# Patient Record
Sex: FEMALE | URBAN - METROPOLITAN AREA
[De-identification: names, ages, dates, MRNs, and addresses within clinical notes are randomized per-mention and may not be internally consistent; named-entity substitution may affect disease eponyms.]

---

## 2021-11-17 ENCOUNTER — OFFICE VISIT (OUTPATIENT)
Dept: URBAN - METROPOLITAN AREA CLINIC 54 | Facility: CLINIC | Age: 61
End: 2021-11-17
Payer: COMMERCIAL

## 2021-11-17 DIAGNOSIS — H25.13 AGE-RELATED NUCLEAR CATARACT, BILATERAL: ICD-10-CM

## 2021-11-17 DIAGNOSIS — H43.821 VITREOMACULAR ADHESION, RIGHT EYE: ICD-10-CM

## 2021-11-17 PROCEDURE — 99204 OFFICE O/P NEW MOD 45 MIN: CPT | Performed by: OPHTHALMOLOGY

## 2021-11-17 PROCEDURE — 92134 CPTRZ OPH DX IMG PST SGM RTA: CPT | Performed by: OPHTHALMOLOGY

## 2021-11-17 RX ORDER — PREDNISOLONE ACETATE 10 MG/ML
1 % SUSPENSION/ DROPS OPHTHALMIC
Qty: 5 | Refills: 2 | Status: ACTIVE
Start: 2021-11-17

## 2021-11-17 RX ORDER — OFLOXACIN 3 MG/ML
0.3 % SOLUTION/ DROPS OPHTHALMIC
Qty: 5 | Refills: 2 | Status: ACTIVE
Start: 2021-11-17

## 2021-11-17 ASSESSMENT — INTRAOCULAR PRESSURE
OS: 13
OD: 16

## 2021-11-17 NOTE — IMPRESSION/PLAN
Impression: Macular cyst, hole, or pseudohole, right eye: H35.341. OCT OU - VMT/MH OD, no IRF/SRF OS  / 234  Plan: There is a symptomatic, full-thickness macular hole (MH). We discussed the natural history, and the RBACs of observation versus vitrectomy were explained. Macular holes can often, but not always, close with vitrectomy. If the hole is successfully closed, the vision usually improves; however the vision does not typically return to normal. The risk of surgery include, but are not limited to, infection, retinal tear and detachment, glaucoma, cataract formation in a phakic eye, hemorrhage, recurrent macular hole, CME, need for further surgery, loss of eye, and blindness. The patient understands that they cannot fly or travel to high altitudes until the gas bubble dissipates, otherwise they risk increased intraocular pressure, pain, and even blindness. The patient understands that positioning will be critical for success.   Patient agrees to proceed.

25g/PPV/ILM peel/gas x FTMH/VMT OD

## 2021-11-24 ENCOUNTER — Encounter (OUTPATIENT)
Dept: URBAN - METROPOLITAN AREA EXTERNAL CLINIC 14 | Facility: EXTERNAL CLINIC | Age: 61
End: 2021-11-24
Payer: COMMERCIAL

## 2021-11-24 PROCEDURE — 67042 VIT FOR MACULAR HOLE: CPT | Performed by: OPHTHALMOLOGY

## 2021-11-25 ENCOUNTER — POST-OPERATIVE VISIT (OUTPATIENT)
Dept: URBAN - METROPOLITAN AREA CLINIC 7 | Facility: CLINIC | Age: 61
End: 2021-11-25
Payer: COMMERCIAL

## 2021-11-25 PROCEDURE — 99024 POSTOP FOLLOW-UP VISIT: CPT | Performed by: OPHTHALMOLOGY

## 2021-11-25 ASSESSMENT — INTRAOCULAR PRESSURE
OS: 13
OD: 16

## 2021-11-25 NOTE — IMPRESSION/PLAN
Impression: S/P 25g/PPV/ILM peel/gas x FTMH/VMT OD OD - 1 Day. Macular cyst, hole, or pseudohole, right eye  H35.341. Plan: Retina attached. IOP satisfactory OU. No infection. Patient is doing well. Patient will start using Prednisolone and Ofloxacin QID OD. Return in 1 week pos

## 2021-12-01 ENCOUNTER — POST-OPERATIVE VISIT (OUTPATIENT)
Dept: URBAN - METROPOLITAN AREA CLINIC 54 | Facility: CLINIC | Age: 61
End: 2021-12-01
Payer: COMMERCIAL

## 2021-12-01 DIAGNOSIS — H35.341 MACULAR CYST, HOLE, OR PSEUDOHOLE, RIGHT EYE: Primary | ICD-10-CM

## 2021-12-01 PROCEDURE — 99024 POSTOP FOLLOW-UP VISIT: CPT | Performed by: OPHTHALMOLOGY

## 2021-12-01 ASSESSMENT — INTRAOCULAR PRESSURE
OD: 14
OS: 17

## 2021-12-01 NOTE — IMPRESSION/PLAN
Impression: S/P 25g/PPV/ILM peel/gas x FTMH/VMT OD OD - 7 Days. Macular cyst, hole, or pseudohole, right eye  H35.341. Plan: No s/s of RD/infection VA/IOP acceptable Post-operative instructions and precautions Reviewed. Gas pos/prec. Call ASAP with changes --Taper Prednisolone acetate 1% TID x 1 wk, BID x 1wk, QD x 1wk, then d/c
--Discontinue Ocuflox Patient has limited peripheral vision and depth perception due to intraocular gas. Rec that she avoid driving for the time being as we wait for the gas to resolve. Stable to work at home, starting Monday 12/6/2021 until further notice. 

1 month POS/OCT

## 2022-01-05 ENCOUNTER — POST-OPERATIVE VISIT (OUTPATIENT)
Dept: URBAN - METROPOLITAN AREA CLINIC 54 | Facility: CLINIC | Age: 62
End: 2022-01-05
Payer: COMMERCIAL

## 2022-01-05 PROCEDURE — 99024 POSTOP FOLLOW-UP VISIT: CPT | Performed by: OPHTHALMOLOGY

## 2022-01-05 ASSESSMENT — INTRAOCULAR PRESSURE
OS: 16
OD: 14

## 2022-01-05 NOTE — IMPRESSION/PLAN
Impression: S/P 25g/PPV/ILM peel/gas x FTMH/VMT OD  - 42 Days. Macular cyst, hole, or pseudohole, right eye  H35.341. OCT OU - FTMH closed with tr residual SRF OD no IRF/SRF OU  / 235 Plan: No s/s of RD/infection VA/IOP acceptable Post-operative instructions and precautions Reviewed. Gas pos/prec. Call ASAP with changes 3m OCT OU

## 2022-04-06 ENCOUNTER — OFFICE VISIT (OUTPATIENT)
Dept: URBAN - METROPOLITAN AREA CLINIC 54 | Facility: CLINIC | Age: 62
End: 2022-04-06
Payer: COMMERCIAL

## 2022-04-06 PROCEDURE — 99214 OFFICE O/P EST MOD 30 MIN: CPT | Performed by: OPHTHALMOLOGY

## 2022-04-06 PROCEDURE — 92134 CPTRZ OPH DX IMG PST SGM RTA: CPT | Performed by: OPHTHALMOLOGY

## 2022-04-06 ASSESSMENT — INTRAOCULAR PRESSURE
OD: 14
OS: 17

## 2022-04-06 NOTE — IMPRESSION/PLAN
Impression: Macular cyst, hole, or pseudohole, right eye  H35.341. S/P 25g/PPV/ILM peel/gas x FTMH/VMT OD (11/24/2021) OCT OU - FTMH closed OD no IRF/SRF OU  / 233 Plan: Well healed, closed, and with good contour. Rec obs Ok for MRx vs CE/IOL from retina perspective
call ASAP with changes 12m OCT OU

## 2023-01-30 ENCOUNTER — OFFICE VISIT (OUTPATIENT)
Dept: URBAN - METROPOLITAN AREA CLINIC 13 | Facility: CLINIC | Age: 63
End: 2023-01-30
Payer: COMMERCIAL

## 2023-01-30 DIAGNOSIS — H35.341 MACULAR CYST, HOLE, OR PSEUDOHOLE, RIGHT EYE: Primary | ICD-10-CM

## 2023-01-30 DIAGNOSIS — H43.821 VITREOMACULAR ADHESION, RIGHT EYE: ICD-10-CM

## 2023-01-30 DIAGNOSIS — H25.13 AGE-RELATED NUCLEAR CATARACT, BILATERAL: ICD-10-CM

## 2023-01-30 PROCEDURE — 92134 CPTRZ OPH DX IMG PST SGM RTA: CPT | Performed by: OPHTHALMOLOGY

## 2023-01-30 PROCEDURE — 99214 OFFICE O/P EST MOD 30 MIN: CPT | Performed by: OPHTHALMOLOGY

## 2023-01-30 ASSESSMENT — INTRAOCULAR PRESSURE
OD: 9
OS: 11

## 2023-01-30 NOTE — IMPRESSION/PLAN
Impression: Macular cyst, hole, or pseudohole, right eye  H35.341. S/P 25g/PPV/ILM peel/gas x FTMH/VMT OD (11/24/2021) OCT OU - FTMH closed OD no IRF/SRF OU  / 240 Plan: Well healed, closed, and with good contour. Rec obs Ok for MRx vs CE/IOL from retina perspective
call ASAP with changes 12m OCT OU